# Patient Record
Sex: FEMALE | Race: WHITE | NOT HISPANIC OR LATINO | Employment: FULL TIME | URBAN - METROPOLITAN AREA
[De-identification: names, ages, dates, MRNs, and addresses within clinical notes are randomized per-mention and may not be internally consistent; named-entity substitution may affect disease eponyms.]

---
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
## 2019-12-12 PROBLEM — F41.9 ANXIETY: Status: ACTIVE | Noted: 2019-12-12

## 2020-02-04 PROBLEM — R79.89 POSITIVE D DIMER: Status: RESOLVED | Noted: 2018-08-25 | Resolved: 2020-02-04

## 2020-02-04 PROBLEM — N10 PYELONEPHRITIS, ACUTE: Status: RESOLVED | Noted: 2018-08-25 | Resolved: 2020-02-04

## 2020-02-04 PROBLEM — R65.20 SEVERE SEPSIS: Status: RESOLVED | Noted: 2018-08-25 | Resolved: 2020-02-04

## 2020-02-04 PROBLEM — A41.9 SEVERE SEPSIS: Status: RESOLVED | Noted: 2018-08-25 | Resolved: 2020-02-04

## 2020-02-04 PROBLEM — J90 PLEURAL EFFUSION: Status: RESOLVED | Noted: 2018-08-29 | Resolved: 2020-02-04

## 2021-05-06 ENCOUNTER — PATIENT MESSAGE (OUTPATIENT)
Dept: RESEARCH | Facility: HOSPITAL | Age: 21
End: 2021-05-06

## 2022-07-27 PROBLEM — O09.211 SUPERVISION OF PREGNANCY WITH HISTORY OF PRE-TERM LABOR IN FIRST TRIMESTER: Status: ACTIVE | Noted: 2022-07-27

## 2023-03-24 ENCOUNTER — OCCUPATIONAL HEALTH (OUTPATIENT)
Dept: URGENT CARE | Facility: CLINIC | Age: 23
End: 2023-03-24
Payer: MEDICAID

## 2023-03-24 DIAGNOSIS — Z02.83 ENCOUNTER FOR DRUG SCREENING: Primary | ICD-10-CM

## 2023-03-24 LAB
CTP QC/QA: YES
POC 10 PANEL DRUG SCREEN: NEGATIVE

## 2023-03-24 PROCEDURE — 80305 POCT RAPID DRUG SCREEN 10 PANEL: ICD-10-PCS | Mod: S$GLB,,, | Performed by: FAMILY MEDICINE

## 2023-03-24 PROCEDURE — 80305 DRUG TEST PRSMV DIR OPT OBS: CPT | Mod: S$GLB,,, | Performed by: FAMILY MEDICINE

## 2023-06-28 ENCOUNTER — PATIENT MESSAGE (OUTPATIENT)
Dept: PSYCHIATRY | Facility: CLINIC | Age: 23
End: 2023-06-28
Payer: MEDICAID

## 2023-07-26 ENCOUNTER — TELEPHONE (OUTPATIENT)
Dept: HEMATOLOGY/ONCOLOGY | Facility: CLINIC | Age: 23
End: 2023-07-26
Payer: MEDICAID

## 2023-07-26 DIAGNOSIS — Z52.001 STEM CELL DONOR: Primary | ICD-10-CM

## 2023-07-26 DIAGNOSIS — Z76.82 STEM CELL TRANSPLANT CANDIDATE: ICD-10-CM

## 2023-07-26 NOTE — TELEPHONE ENCOUNTER
"Spoke with Klaudia Hussain Bonilla, potential donor for Vinod Young name today via telephone. Discussed the fact that her father is in need of a stem cell transplant. Klaudia said that she was interested in possibly being the donor for herfather's transplant. Briefly discussed the donation process. Explained to Klaudia that if she would match her father, that her commitment to donate is very important and that she has the right to change his mind. Also explained however, that a late decision not to donate can be life threatening to the patient. Explained that she should think seriously about her commitment before she has her tissue typing drawn, and if she feels uncomfortable moving forward, that it is okay for her to say "no." Also informed Klaudia that if she is a match for her father, that we will contact her and ask if she is willing to donate. If she agrees to proceed, we will ask her about her health and schedule a physician appointment and more testing. She will receive a physical examination to be sure it is safe for her to donate and that her donation will provide the best possible outcome for the patient. We will follow medical guidelines that protect her health as a potential donor as well as the health of the transplant patient. Klaudia was given the opportunity to ask questions.     "

## 2023-07-31 ENCOUNTER — LAB VISIT (OUTPATIENT)
Dept: LAB | Facility: HOSPITAL | Age: 23
End: 2023-07-31
Attending: INTERNAL MEDICINE
Payer: COMMERCIAL

## 2023-07-31 DIAGNOSIS — Z52.001 STEM CELL DONOR: ICD-10-CM

## 2023-07-31 PROCEDURE — 81373 HLA I TYPING 1 LOCUS LR: CPT | Mod: PO | Performed by: INTERNAL MEDICINE

## 2023-07-31 PROCEDURE — 81376 HLA II TYPING 1 LOCUS LR: CPT | Mod: 59,PO | Performed by: INTERNAL MEDICINE

## 2023-07-31 PROCEDURE — 81376 HLA II TYPING 1 LOCUS LR: CPT | Mod: PO | Performed by: INTERNAL MEDICINE

## 2023-07-31 PROCEDURE — 81373 HLA I TYPING 1 LOCUS LR: CPT | Mod: 59,PO | Performed by: INTERNAL MEDICINE

## 2023-07-31 PROCEDURE — 86901 BLOOD TYPING SEROLOGIC RH(D): CPT | Performed by: INTERNAL MEDICINE

## 2023-07-31 PROCEDURE — 36415 COLL VENOUS BLD VENIPUNCTURE: CPT | Mod: PO | Performed by: INTERNAL MEDICINE

## 2023-08-01 LAB — ABO + RH BLD: NORMAL

## 2023-08-14 ENCOUNTER — TELEPHONE (OUTPATIENT)
Dept: HEMATOLOGY/ONCOLOGY | Facility: CLINIC | Age: 23
End: 2023-08-14
Payer: MEDICAID

## 2023-08-14 NOTE — TELEPHONE ENCOUNTER
----- Message from Melissa Castañeda, Patient Care Assistant sent at 8/14/2023  1:07 PM CDT -----  Type: Needs Medical Advice  Who Called:  Klaudia   Arturo Call Back Number: [phone]   Additional Information: Klaudia states 2 -3 weeks ago she did a stem cells  blood work and  to see if she was a match for her dad and was wanting the results, please call to further discuss thank you

## 2023-08-17 ENCOUNTER — HOSPITAL ENCOUNTER (EMERGENCY)
Facility: HOSPITAL | Age: 23
Discharge: HOME | End: 2023-08-17
Attending: EMERGENCY MEDICINE
Payer: MEDICAID

## 2023-08-17 ENCOUNTER — NURSE TRIAGE (OUTPATIENT)
Dept: ADMINISTRATIVE | Facility: CLINIC | Age: 23
End: 2023-08-17
Payer: MEDICAID

## 2023-08-17 VITALS
DIASTOLIC BLOOD PRESSURE: 87 MMHG | SYSTOLIC BLOOD PRESSURE: 141 MMHG | RESPIRATION RATE: 16 BRPM | TEMPERATURE: 98 F | HEART RATE: 81 BPM | OXYGEN SATURATION: 99 %

## 2023-08-17 DIAGNOSIS — R10.31 RIGHT LOWER QUADRANT ABDOMINAL PAIN: ICD-10-CM

## 2023-08-17 DIAGNOSIS — R42 LIGHTHEADED: ICD-10-CM

## 2023-08-17 DIAGNOSIS — K92.1 HEMATOCHEZIA: Primary | ICD-10-CM

## 2023-08-17 LAB — HLATY INTERPRETATION: NORMAL

## 2023-08-17 PROCEDURE — 93010 ELECTROCARDIOGRAM REPORT: CPT | Mod: ,,, | Performed by: INTERNAL MEDICINE

## 2023-08-17 PROCEDURE — 99283 EMERGENCY DEPT VISIT LOW MDM: CPT

## 2023-08-17 PROCEDURE — 93005 ELECTROCARDIOGRAM TRACING: CPT

## 2023-08-17 PROCEDURE — 93010 EKG 12-LEAD: ICD-10-PCS | Mod: ,,, | Performed by: INTERNAL MEDICINE

## 2023-08-18 ENCOUNTER — TELEPHONE (OUTPATIENT)
Dept: HEMATOLOGY/ONCOLOGY | Facility: CLINIC | Age: 23
End: 2023-08-18
Payer: MEDICAID

## 2023-08-18 NOTE — ED TRIAGE NOTES
Klaudia Bonilla, an 22 y.o. female presents to the ED from home with c/o bright red dripping blood while urinating earlier in the evening. Pt was seen at [facility] for same sx earlier this afternoon.      Chief Complaint   Patient presents with    Rectal Bleeding     Reports she tried to push out a BM prior to arrival and since has had bright red blood dripping from rectum. No pain but some pressure in that area. Colonoscopy 2 weeks ago.  Felt lightheaded earlier and dizzy but no longer. No chest pain or shortness of breath whatsoever.     Review of patient's allergies indicates:  No Known Allergies  Past Medical History:   Diagnosis Date    ADHD (attention deficit hyperactivity disorder) 11/2012    dx made by psychologist Dr Jeff    Adjustment reaction with antisocial behavior 1/22/2013    Depression 11/2012    by psychologist Dr Jeff    History of psychiatric hospitalization 1/16/2013 through ?    [facility]    Major depressive disorder, single episode, in partial or unspecified remission 5/1/2013    Moderate major depression 11/26/2012    Psychiatric exam 12/2013    see report from Dr Feng's attached to my encounter of 1/22/2013    Sepsis

## 2023-08-18 NOTE — TELEPHONE ENCOUNTER
Called patient to notify, HLA results are not back yet, still in process. Allowed time for questions. Will reach out once results are back.

## 2023-08-18 NOTE — TELEPHONE ENCOUNTER
"Pt reports started having abdominal pain that started at lunch today and had bleeding rectally after a bowel movement that seemed like a lot so she went to the ED and dx with anemia. But just now pt went to the bathroom to urinate, states she felt some pressure so she gently pushed and a lot more bright red blood came out, now she is steadily dripping blood from her rectum. Pt advised to go to the ED now per protocol, Pt encouraged to call back with any worsening symptoms or questions and verbalized understanding.    Reason for Disposition   SEVERE rectal bleeding (large blood clots; constant or on and off bleeding)    Additional Information   Negative: Shock suspected (e.g., cold/pale/clammy skin, too weak to stand, low BP, rapid pulse)   Negative: Difficult to awaken or acting confused (e.g., disoriented, slurred speech)   Negative: Passed out (i.e., lost consciousness, collapsed and was not responding)   Negative: [1] Vomiting AND [2] contains red blood or black ("coffee ground") material  (Exception: Few red streaks in vomit that only happened once.)   Negative: Sounds like a life-threatening emergency to the triager    Protocols used: Rectal Bleeding-A-AH    "

## 2023-08-18 NOTE — ED PROVIDER NOTES
"Encounter Date: 8/17/2023       History     Chief Complaint   Patient presents with    Rectal Bleeding     Reports she tried to push out a BM prior to arrival and since has had bright red blood dripping from rectum. No pain but some pressure in that area. Colonoscopy 2 weeks ago.  Felt lightheaded earlier and dizzy but no longer. No chest pain or shortness of breath whatsoever.     22-year-old female with history of ADHD and depression presents to the ED regarding rectal bleeding.  Patient had 1 episode this morning when having a bowel movement with significant amount of blood so into [facility] ED. She had a full workup that was unremarkable and discharged.  He had right lower quadrant abdominal pain and tried to have another bowel movement this evening but states "I only push but I did have drops of blood coming out." States she was told to come back to the ED if symptoms persisted.  Pain resolved and patient is currently asymptomatic.  She was dizzy earlier today but that has resolved and has not had any since leaving the [facility] ED. patient has had an episode of bloody stool a few months ago.  She is been seen by GI and recently had colonoscopy done 2 weeks ago at [facility].  She is to follow up with them on Wednesday regarding her biopsies.  Denies nausea, vomiting, chest pain, shortness of breath, constipation, diarrhea, URI symptoms, dysuria, or any other complaints at this time.    The history is provided by the patient and medical records.     Review of patient's allergies indicates:  No Known Allergies  Past Medical History:   Diagnosis Date    ADHD (attention deficit hyperactivity disorder) 11/2012    dx made by psychologist Dr Jeff    Adjustment reaction with antisocial behavior 1/22/2013    Depression 11/2012    by psychologist Dr Jeff    History of psychiatric hospitalization 1/16/2013 through ?    [facility] Acute Psychiatry    Major depressive disorder, single " episode, in partial or unspecified remission 5/1/2013    Moderate major depression 11/26/2012    Psychiatric exam 12/2013    see report from Dr Feng's attached to my encounter of 1/22/2013    Sepsis      Past Surgical History:   Procedure Laterality Date    CHOLECYSTECTOMY  03/01/2015    TYMPANOSTOMY TUBE PLACEMENT       Family History   Problem Relation Age of Onset    Diabetes Mother     Hodgkin's lymphoma Father     Leukemia Father     ADD / ADHD Sister     Diabetes Maternal Grandfather     Diabetes Paternal Grandmother     Breast cancer Paternal Grandmother     ADD / ADHD Cousin     Depression Cousin      Social History     Tobacco Use    Smoking status: Never    Smokeless tobacco: Never   Substance Use Topics    Alcohol use: No    Drug use: Never     Review of Systems   Constitutional:  Negative for fever.   HENT:  Negative for sore throat.    Respiratory:  Negative for shortness of breath.    Cardiovascular:  Negative for chest pain.   Gastrointestinal:  Positive for abdominal pain and blood in stool. Negative for nausea.   Genitourinary:  Negative for dysuria.   Musculoskeletal:  Negative for back pain.   Skin:  Negative for rash.   Neurological:  Negative for weakness.   Hematological:  Does not bruise/bleed easily.       Physical Exam     Initial Vitals [08/17/23 2228]   BP Pulse Resp Temp SpO2   (!) 141/87 81 16 98.3 °F (36.8 °C) 99 %      MAP       --         Physical Exam    Vitals reviewed.  Constitutional: She appears well-developed and well-nourished. She is not diaphoretic. No distress.   HENT:   Head: Normocephalic and atraumatic.   Neck: Neck supple.   Cardiovascular:  Normal rate, regular rhythm and normal heart sounds.     Exam reveals no gallop and no friction rub.       No murmur heard.  Pulmonary/Chest: Breath sounds normal. She has no wheezes. She has no rhonchi. She has no rales.   Abdominal: Abdomen is soft and flat. There is no abdominal tenderness. There is no rebound, no guarding, no  tenderness at McBurney's point and negative Allen's sign.   Genitourinary: Rectum:      Guaiac result positive.      No rectal mass, anal fissure, tenderness, external hemorrhoid, internal hemorrhoid or abnormal anal tone.   Guaiac positive stool. : Acceptable.   Genitourinary Comments: No gross blood.  No stool obtained but guaiac positive  Chaperone present throughout exam     Musculoskeletal:         General: Normal range of motion.      Cervical back: Neck supple.     Neurological: She is alert and oriented to person, place, and time.   Psychiatric: She has a normal mood and affect.         ED Course   Procedures  Labs Reviewed   POCT URINE PREGNANCY     EKG Readings: (Independently Interpreted)   Initial Reading: No STEMI. Rhythm: Normal Sinus Rhythm. Heart Rate: 88. Ectopy: No Ectopy. Conduction: Normal. ST Segments: Normal ST Segments. T Waves Flipped: V1. Axis: Normal.       Imaging Results    None          Medications - No data to display  Medical Decision Making  Amount and/or Complexity of Data Reviewed  External Data Reviewed: labs, radiology and notes.     Details: Seen in [facility] ED today for rectal bleeding. Tachycardic at first but resolved. Her rectal examination was also unremarkable, there is no active bleeding. Patient's lab work revealed no significant abnormality, hemoglobin 10.6, appears consistent with prior visits. CT unremarkable. D/c and advised follow-up instructions with her gastroenterologist.   CT Impression:  No acute intra-abdominal abnormality.  Labs: ordered.  ECG/medicine tests: ordered and independent interpretation performed.         APC / Resident Notes:   22-year-old female with history of ADHD and depression presents to the ED regarding rectal bleeding today. Seen at [facility] ED today with extensive work up that was unremarkable. Stable H&H and CT no acute abnormality. Had another episode so came to ED. VSS.  Clinically well-appearing, in no acute distress. No  gross bleeding though positive guaiac. No anal fissure or hemorrhoids.     My differential diagnoses include but are not limited to:   Colitis, diverticulitis, hemorrhoids, angiodysplasia, diverticulosis, inflammatory bowel disease, PUD    Patient currently asymptomatic with normal vitals. After review of labs and imaging today, no not think further work up is warranted at this time. No lightheadedness/dizziness since discharge at [facility], no need for emergent endoscopy or colonoscopy at this time. Patient is following up with her gastroenterologist Wednesday. Strict ED return precautions given with all questions answered.  Patient verbalized understanding and agreed to plan. Vitals are stable and safe for discharge.                         Clinical Impression:   Final diagnoses:  [R42] Lightheaded  [K92.1] Hematochezia (Primary)  [R10.31] Right lower quadrant abdominal pain        ED Disposition Condition    Discharge Stable          ED Prescriptions    None       Follow-up Information       Follow up With Specialties Details Why Contact Info    [place] MD Internal Medicine Schedule an appointment as soon as possible for a visit   [address] [facility] [address]  [phone]      [facility] - Emergency Dept Emergency Medicine Go to  If symptoms worsen [id number]  [facility] [id number] [phone]             Sharon Huerta PA-C  08/18/23 0120

## 2023-08-24 LAB
HLA DQA1 1: NORMAL
HLA DQA1 2: NORMAL
HLA DRB4 1: NORMAL
HLA REALTIMEPCR TYPING CLASSI&II INTERPRETATION: NORMAL
HLA-A 1 SERO. EQUIV: 2
HLA-A 1: NORMAL
HLA-A 2 SERO. EQUIV: NORMAL
HLA-A 2: NORMAL
HLA-B 1 SERO. EQUIV: 7
HLA-B 1: NORMAL
HLA-B 2 SERO. EQUIV: 51
HLA-B 2: NORMAL
HLA-BW 1 SERO. EQUIV: 4
HLA-BW 2 SERO. EQUIV: 6
HLA-C 1: NORMAL
HLA-C 2: NORMAL
HLA-CW 1 SERO. EQUIV: 7
HLA-CW 2 SERO. EQUIV: 15
HLA-DPA1 1: NORMAL
HLA-DPA1 2: NORMAL
HLA-DPB1 1: NORMAL
HLA-DPB1 2: NORMAL
HLA-DQ 1 SERO. EQUIV: 2
HLA-DQ 2 SERO. EQUIV: 6
HLA-DQB1 1: NORMAL
HLA-DQB1 2: NORMAL
HLA-DRB1 1 SERO. EQUIV: 17
HLA-DRB1 1: NORMAL
HLA-DRB1 2 SERO. EQUIV: 15
HLA-DRB1 2: NORMAL
HLA-DRB3 1: NORMAL
HLA-DRB3 2: NORMAL
HLA-DRB345 1 SERO. EQUIV: 52
HLA-DRB345 2 SERO. EQUIV: 51
HLA-DRB4 2: NORMAL
HLA-DRB5 1: NORMAL
HLA-DRB5 2: NORMAL
RTPCR TESTING DATE: NORMAL

## 2023-10-20 ENCOUNTER — TELEPHONE (OUTPATIENT)
Dept: OBSTETRICS AND GYNECOLOGY | Facility: CLINIC | Age: 23
End: 2023-10-20

## 2023-10-20 NOTE — TELEPHONE ENCOUNTER
Pt called wanting to nathan a preg confirmation appt  Appt nathan for 11/16/2023 at 10am  Pt verb understanding